# Patient Record
Sex: MALE | Race: WHITE | NOT HISPANIC OR LATINO | Employment: FULL TIME | URBAN - METROPOLITAN AREA
[De-identification: names, ages, dates, MRNs, and addresses within clinical notes are randomized per-mention and may not be internally consistent; named-entity substitution may affect disease eponyms.]

---

## 2024-06-12 ENCOUNTER — OFFICE VISIT (OUTPATIENT)
Dept: PODIATRY | Facility: CLINIC | Age: 24
End: 2024-06-12
Payer: COMMERCIAL

## 2024-06-12 ENCOUNTER — ANCILLARY PROCEDURE (OUTPATIENT)
Dept: GENERAL RADIOLOGY | Facility: CLINIC | Age: 24
End: 2024-06-12
Attending: PODIATRIST
Payer: COMMERCIAL

## 2024-06-12 ENCOUNTER — TELEPHONE (OUTPATIENT)
Dept: PODIATRY | Facility: CLINIC | Age: 24
End: 2024-06-12

## 2024-06-12 VITALS
DIASTOLIC BLOOD PRESSURE: 60 MMHG | HEIGHT: 68 IN | WEIGHT: 165.2 LBS | SYSTOLIC BLOOD PRESSURE: 118 MMHG | BODY MASS INDEX: 25.04 KG/M2

## 2024-06-12 DIAGNOSIS — S99.922A INJURY OF TOE ON LEFT FOOT, INITIAL ENCOUNTER: ICD-10-CM

## 2024-06-12 DIAGNOSIS — S99.921A INJURY OF TOE, RIGHT, INITIAL ENCOUNTER: ICD-10-CM

## 2024-06-12 DIAGNOSIS — S92.411A CLOSED DISPLACED FRACTURE OF PROXIMAL PHALANX OF RIGHT GREAT TOE, INITIAL ENCOUNTER: Primary | ICD-10-CM

## 2024-06-12 PROCEDURE — 73660 X-RAY EXAM OF TOE(S): CPT | Mod: TC | Performed by: RADIOLOGY

## 2024-06-12 PROCEDURE — 99204 OFFICE O/P NEW MOD 45 MIN: CPT | Performed by: PODIATRIST

## 2024-06-12 NOTE — TELEPHONE ENCOUNTER
Form or Letter   Type or form/letter needing completion: Needing a letter for his employer that he is not able to work  Provider:   Date form needed: ASAP  Once completed: Mail form to Name: jessica, at Address: [address]    Could we send this information to you in gokit or would you prefer to receive a phone call?:   Patient would prefer a phone call   Okay to leave a detailed message?: Yes at Cell number on file:    Telephone Information:   Mobile [phone]   [place] [phone]

## 2024-06-12 NOTE — PATIENT INSTRUCTIONS
Thank you for choosing [facility] Podiatry / Foot & Ankle Surgery!    DR. DELGADO'S CLINIC LOCATIONS:     [facility] TRIAGE LINE: [phone]   [address] [place] [address] APPOINTMENTS: [phone]   [place] [address] RADIOLOGY: [phone]   (Every other Tues - Wed - Fri PM) SET UP SURGERY: [phone]    PHYSICAL THERAPY: [phone]   [place] SPECIALTY BILLING QUESTIONS: [phone] [address] FAX: [phone]   [place] [address]    (Thurs & Fri AM)       PRICE THERAPY  Many aches and pains throughout the foot and ankle can be helped with many simple treatments. This is usually described as PRICE Therapy.      P - Protection - often times, inflammation/pain in the lower extremity is not able to improve simply because the areas involved are never allowed to rest. Every step we take can bother the problematic area. Protecting those areas is an important step in the healing process. This may involve a walking cast boot, a special insert/orthotic device, an ankle brace, or simply avoiding barefoot walking.    R - Rest - in addition to protecting the foot/ankle, resting is an important, but often times difficult, treatment option. Getting off your feet when they bother you, and specifically avoiding activities that cause pain/discomfort, are very beneficial to prevent, and treat, foot/ankle pain.      I - Ice - icing regularly can help to decrease inflammation and swelling in the foot, thus decreasing pain. Using an ice pack or a bag of frozen veggies works very well. Ice for 20 minutes multiple times per day as needed.  Do not place the ice directly on the skin as this can cause tissue damage.    C - Compression - using a compression wrap or an ACE wrap can help to decrease swelling, which can help to decrease pain. Wearing the wraps is generally not needed at night, but they should be worn on a regular basis when you are going to be on your feet for prolonged periods as gravity tends  "to pull fluids down to your feet/ankles.    E - Elevation - elevating your lower extremities multiple times daily for 15-20 minutes can help to decrease swelling, which works well in decreasing pain levels.    NSAID/Tylenol - Anti-inflammatories like Aleve or ibuprofen, and/or a pain medication, such as Tylenol, can help to improve pain levels and get the issue resolved sooner rather than later. Anyone with liver issues should be careful with Tylenol, and anyone with high blood pressure or heart, stomach or kidney issues should be careful with anti-inflammatories. Please ask if you have questions about these medications, including dosage.    AIRCAST / CAM WALKING BOOT INSTRUCTIONS  - Do NOT drive with CAM walker on. This is due to safety and legal issues.   - Do NOT wear the CAM walker on long car/train rides or on an airplane.  - Remove the CAM walker several times a day and do ankle range of motion (ROM) exercises/wiggle toes.  - It is recommended that a thick-soled shoe be worn on the other foot to offset any created leg length issue.    - You can purchase an \"even up\" on [employer] to place under the other shoe to help too.  - The boot does not have to be worn at night.   - There is an increased risk of developing a blood clot with lower extremity immobilization. ROM exercises and knee-high compression (tenso /ACE wrap) is recommended to lower that risk.   - You should seek medical attention if you experience calf swelling and/or pain, chest pain, or shortness of breath.   If you need to return or exchange the boot, please contact [facility] @ [phone]    "

## 2024-06-12 NOTE — LETTER
6/12/2024      Chun Muro  [address]  [facility] [address]      Dear Colleague,    Thank you for referring your patient, Chun Muro, to the [facility]. Please see a copy of my visit note below.    ASSESSMENT:  Encounter Diagnoses   Name Primary?     Injury of toe on left foot, initial encounter Yes     Closed displaced fracture of proximal phalanx of right great toe, initial encounter      MEDICAL DECISION MAKING:  I personally reviewed the left great toe x-ray images with Chun.  There is a comminuted fracture of the proximal phalanx which involves the joint.  The AP shows relatively good anatomic alignment.  However, the lateral projection shows the distal aspect of the proximal phalanx displaced and tipped upwards.  I explained that this likely would lead to deformity and a higher risk of interphalangeal joint arthritis.    I can offer open reduction with internal fixation and try to reduce his fracture in the sagittal plane.  I explained that this is usually done with a dorsal plate.  He certainly could proceed with conservative care yet this would be more unpredictable and more likely to lead to future arthrosis.  Nonetheless, with surgical intervention or not there is some risk of developing posttraumatic arthritis.  Surgical intervention later in the form of interphalangeal joint arthrodesis might be needed.  I also encouraged him to seek a second opinion if he has any reservations about surgical intervention.    Recommendations:  PRICE therapy  Short Aircast immobilization.  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  He will consider this.  He is open to a second opinion.  The case request is placed.  Chun will check with his employer regarding work options.  I do not recommend any work on his feet during recovery.      Disclaimer: This note consists of symbols derived from  keyboarding, dictation and/or voice recognition software. As a result, there may be errors in the script that have gone undetected. Please consider this when interpreting information found in this chart.    William Crowe, COLBY, FACFAS, MS    [facility] of Podiatry/Foot & Ankle Surgery      ____________________________________________________________________    HPI:       Chun follows up from an emergency department visit on 6/6/2024.  He was diagnosed with a fracture of the right great toe.  The injury occurred 1 week ago.  He apparently kicked some 6 foot, secured PVC piping.  Pain rating is a 4 out of 10 and intermittent.  He was fit with a surgical shoe, is icing and elevating.  *  Past Medical History:   Diagnosis Date     ADHD (attention deficit hyperactivity disorder)    *  *No past surgical history on file.*  *  No current outpatient medications on file.         EXAM:    Vitals: There were no vitals taken for this visit.  BMI: There is no height or weight on file to calculate BMI.    Constitutional:  Chun Muro is in no apparent distress, appears well-nourished.  Cooperative with history and physical exam.    Vascular:  Pedal pulses are palpable for both the DP and PT arteries.  CFT < 3 sec.    Right hallux edema.    Neuro: Light touch sensation is intact to the L4, L5, S1 distributions  No evidence of weakness, spasticity, or contracture in the lower extremities.     Derm: Ecchymosis involving the right hallux.  No skin injury.  No blisters.    Musculoskeletal:    Lower extremity muscle strength is normal. No gross deformities.      X-Ray Findings:  I personally reviewed the right great toe images.  See comments above    XR Foot Right G/E 3 Views  Order: [id number]  Impression      There is a comminuted fracture of the proximal phalanx of the great toe with intra-articular extension to the IP joint. Alignment at the first MTP and IP joints is anatomic.    Other bones of the hindfoot, midfoot, and  forefoot are intact with normal alignment and preserved joint spaces.  Narrative    For Patients: As a result of the 21st Century Cures Act, medical imaging exams and procedure reports are released immediately into your electronic medical record. You may view this report before your referring provider. If you have questions, please contact your health care provider.    EXAM: XR FOOT 3 VIEWS RIGHT  LOCATION: [facility]  DATE: 6/6/2024    INDICATION: Trauma  COMPARISON: None.  Exam End: 06/06/24  7:06 PM    Specimen Collected: 06/06/24  7:06 PM Last Resulted: 06/06/24  7:11 PM   Received From: [facility] & [facility]      Again, thank you for allowing me to participate in the care of your patient.        Sincerely,        William Crowe DPM

## 2024-06-12 NOTE — PROGRESS NOTES
ASSESSMENT:  Encounter Diagnoses   Name Primary?    Injury of toe on left foot, initial encounter Yes    Closed displaced fracture of proximal phalanx of right great toe, initial encounter      MEDICAL DECISION MAKING:  I personally reviewed the left great toe x-ray images with Chun.  There is a comminuted fracture of the proximal phalanx which involves the joint.  The AP shows relatively good anatomic alignment.  However, the lateral projection shows the distal aspect of the proximal phalanx displaced and tipped upwards.  I explained that this likely would lead to deformity and a higher risk of interphalangeal joint arthritis.    I can offer open reduction with internal fixation and try to reduce his fracture in the sagittal plane.  I explained that this is usually done with a dorsal plate.  He certainly could proceed with conservative care yet this would be more unpredictable and more likely to lead to future arthrosis.  Nonetheless, with surgical intervention or not there is some risk of developing posttraumatic arthritis.  Surgical intervention later in the form of interphalangeal joint arthrodesis might be needed.  I also encouraged him to seek a second opinion if he has any reservations about surgical intervention.    Recommendations:  PRICE therapy  Short Aircast immobilization.  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  He will consider this.  He is open to a second opinion.  The case request is placed.  Chun will check with his employer regarding work options.  I do not recommend any work on his feet during recovery.      Disclaimer: This note consists of symbols derived from keyboarding, dictation and/or voice recognition software. As a result, there may be errors in the script that have gone undetected. Please consider this when interpreting information found in this chart.    William Crowe DPM, FACARASELI, MS [place]  Department of Podiatry/Foot & Ankle Surgery      ____________________________________________________________________    HPI:       Chun follows up from an emergency department visit on 6/6/2024.  He was diagnosed with a fracture of the right great toe.  The injury occurred 1 week ago.  He apparently kicked some 6 foot, secured PVC piping.  Pain rating is a 4 out of 10 and intermittent.  He was fit with a surgical shoe, is icing and elevating.  *  Past Medical History:   Diagnosis Date    ADHD (attention deficit hyperactivity disorder)    *  *No past surgical history on file.*  *  No current outpatient medications on file.         EXAM:    Vitals: There were no vitals taken for this visit.  BMI: There is no height or weight on file to calculate BMI.    Constitutional:  Chun Muro is in no apparent distress, appears well-nourished.  Cooperative with history and physical exam.    Vascular:  Pedal pulses are palpable for both the DP and PT arteries.  CFT < 3 sec.    Right hallux edema.    Neuro: Light touch sensation is intact to the L4, L5, S1 distributions  No evidence of weakness, spasticity, or contracture in the lower extremities.     Derm: Ecchymosis involving the right hallux.  No skin injury.  No blisters.    Musculoskeletal:    Lower extremity muscle strength is normal. No gross deformities.      X-Ray Findings:  I personally reviewed the right great toe images.  See comments above    XR Foot Right G/E 3 Views  Order: [id number]  Impression      There is a comminuted fracture of the proximal phalanx of the great toe with intra-articular extension to the IP joint. Alignment at the first MTP and IP joints is anatomic.    Other bones of the hindfoot, midfoot, and forefoot are intact with normal alignment and preserved joint spaces.  Narrative    For Patients: As a result of the 21st Century Cures Act, medical imaging exams and procedure reports are released immediately into your electronic medical record. You may  view this report before your referring provider. If you have questions, please contact your health care provider.    EXAM: XR FOOT 3 VIEWS RIGHT  LOCATION: [facility]  DATE: 6/6/2024    INDICATION: Trauma  COMPARISON: None.  Exam End: 06/06/24  7:06 PM    Specimen Collected: 06/06/24  7:06 PM Last Resulted: 06/06/24  7:11 PM   Received From: [facility]

## 2024-06-12 NOTE — LETTER
June 12, 2024      Chun Muro  [address] [facility] [address]        To Whom It May Concern:    Chun Muro  was evaluated in the podiatry clinic today for a fracture of the right great toe.  This will require, at a minimum, 6 weeks of walking in an Aircast or walking cast.  Surgery was also recommended to fix the break.    It is recommended that he not perform any prolonged weightbearing duties.  This could possibly be for the next 8 to 10 weeks or more.  Seated work only with occasional weightbearing is acceptable.  The protective boot must be worn at all times when weightbearing.      Sincerely,        William Crowe, COLBY

## 2024-06-13 ENCOUNTER — TELEPHONE (OUTPATIENT)
Dept: PODIATRY | Facility: CLINIC | Age: 24
End: 2024-06-13
Payer: COMMERCIAL

## 2024-06-13 NOTE — TELEPHONE ENCOUNTER
Notified patient that letter can be accessed in [employer]. He was appreciative of this. He inquired about a cost estimate for surgery. Writer provided phone number for cost estimates with the business office. No further action needed.     Maribel Garcia MSA, ATC  Certified Athletic Trainer

## 2024-06-19 ENCOUNTER — TELEPHONE (OUTPATIENT)
Dept: PODIATRY | Facility: CLINIC | Age: 24
End: 2024-06-19
Payer: COMMERCIAL

## 2024-06-19 DIAGNOSIS — S99.921A INJURY OF TOE, RIGHT, INITIAL ENCOUNTER: ICD-10-CM

## 2024-06-19 DIAGNOSIS — S92.411A CLOSED DISPLACED FRACTURE OF PROXIMAL PHALANX OF RIGHT GREAT TOE, INITIAL ENCOUNTER: Primary | ICD-10-CM

## 2024-06-19 NOTE — PROGRESS NOTES
Surgery is scheduled for 6/26/2024.  Therefore, a CT of the right foot is ordered to further evaluate the extent of the fracture for planned open reduction with internal fixation.    Dr. Crowe

## 2024-06-19 NOTE — TELEPHONE ENCOUNTER
Patient has been scheduled for surgery. Details are below.    Date of Surgery: 06/26/24    Approximate Arrival Time: SURGERY CENTER WILL CALL 3/4 DAYS PRIOR TO CONFIRM A TIME   Surgeon: Dr. William Crowe    Procedure: OPEN REDUCTION INTERNAL FIXATION, FRACTURE, TOE - Right  Location: [facility], [address] [place] [address] [place] [address]  Surgery Consult: NA  PreOp Physical: 06/25/24  PostOp: 07/03 & 07/11  Packet Mailed/MyChart Sent: YES  Added to [place]: YES    Spoke to: YASMIN

## 2024-06-20 ENCOUNTER — HOSPITAL ENCOUNTER (OUTPATIENT)
Dept: CT IMAGING | Facility: CLINIC | Age: 24
Discharge: HOME | End: 2024-06-20
Attending: PODIATRIST | Admitting: PODIATRIST
Payer: COMMERCIAL

## 2024-06-20 DIAGNOSIS — S99.921A INJURY OF TOE, RIGHT, INITIAL ENCOUNTER: ICD-10-CM

## 2024-06-20 DIAGNOSIS — S92.411A CLOSED DISPLACED FRACTURE OF PROXIMAL PHALANX OF RIGHT GREAT TOE, INITIAL ENCOUNTER: ICD-10-CM

## 2024-06-20 PROCEDURE — 73700 CT LOWER EXTREMITY W/O DYE: CPT | Mod: RT

## 2024-06-25 ENCOUNTER — OFFICE VISIT (OUTPATIENT)
Dept: FAMILY MEDICINE | Facility: CLINIC | Age: 24
End: 2024-06-25
Payer: COMMERCIAL

## 2024-06-25 VITALS
HEIGHT: 68 IN | DIASTOLIC BLOOD PRESSURE: 75 MMHG | TEMPERATURE: 98.4 F | SYSTOLIC BLOOD PRESSURE: 117 MMHG | RESPIRATION RATE: 12 BRPM | OXYGEN SATURATION: 96 % | BODY MASS INDEX: 23.37 KG/M2 | HEART RATE: 83 BPM | WEIGHT: 154.2 LBS

## 2024-06-25 DIAGNOSIS — Z01.818 PREOP GENERAL PHYSICAL EXAM: ICD-10-CM

## 2024-06-25 DIAGNOSIS — S92.401K: Primary | ICD-10-CM

## 2024-06-25 LAB — HGB BLD-MCNC: 14.8 G/DL (ref 13.3–17.7)

## 2024-06-25 PROCEDURE — 99203 OFFICE O/P NEW LOW 30 MIN: CPT | Performed by: PHYSICIAN ASSISTANT

## 2024-06-25 PROCEDURE — 36415 COLL VENOUS BLD VENIPUNCTURE: CPT | Performed by: PHYSICIAN ASSISTANT

## 2024-06-25 PROCEDURE — 85018 HEMOGLOBIN: CPT | Performed by: PHYSICIAN ASSISTANT

## 2024-06-25 ASSESSMENT — PAIN SCALES - GENERAL: PAINLEVEL: NO PAIN (0)

## 2024-06-25 NOTE — PROGRESS NOTES
Preoperative Evaluation  [facility]  [address], [address]  [facility] [address]  Phone: [phone]  Primary Provider: JOSE MERCER  Pre-op Performing Provider: Nithya Flores PA-C  Jun 25, 2024 6/25/2024   Surgical Information   What procedure is being done? OPEN REDUCTION INTERNAL FIXATION, FRACTURE, TOE    Facility or Hospital where procedure/surgery will be performed: [facility]   Who is doing the procedure / surgery? Dr. MACY Crowe   Date of surgery / procedure: 6/26/24   Time of surgery / procedure: 1130   Where do you plan to recover after surgery? at home with family        Fax number for surgical facility: Note does not need to be faxed, will be available electronically in Epic.    Assessment & Plan     The proposed surgical procedure is considered LOW risk.    Closed displaced fracture of phalanx of right great toe with nonunion, unspecified phalanx, subsequent encounter  Pt not on any asa or NSAIDs    Preop general physical exam    - Hemoglobin            - No identified additional risk factors other than previously addressed         Recommendation  Approval given to proceed with proposed procedure, without further diagnostic evaluation.    Cassi Mccollum is a 24 year old, presenting for the following:  Physical        HPI related to upcoming procedure: ORIF right great toe fracture        6/25/2024   Pre-Op Questionnaire   Have you ever had a heart attack or stroke? No   Have you ever had surgery on your heart or blood vessels, such as a stent placement, a coronary artery bypass, or surgery on an artery in your head, neck, heart, or legs? No   Do you have chest pain with activity? No   Do you have a history of heart failure? No   Do you currently have a cold, bronchitis or symptoms of other infection? No   Do you have a cough, shortness of breath, or wheezing? No   Do you or anyone in your family have previous history of blood clots? No   Do you or does anyone in  your family have a serious bleeding problem such as prolonged bleeding following surgeries or cuts? No   Have you ever had problems with anemia or been told to take iron pills? No   Have you had any abnormal blood loss such as black, tarry or bloody stools? No   Have you ever had a blood transfusion? No   Are you willing to have a blood transfusion if it is medically needed before, during, or after your surgery? Yes   Have you or any of your relatives ever had problems with anesthesia? No   Do you have sleep apnea, excessive snoring or daytime drowsiness? No   Do you have any artifical heart valves or other implanted medical devices like a pacemaker, defibrillator, or continuous glucose monitor? No   Do you have artificial joints? No   Are you allergic to latex? No        Health Care Directive  Patient does not have a Health Care Directive or Living Will: Discussed advance care planning with patient; however, patient declined at this time.    Preoperative Review of    reviewed - no record of controlled substances prescribed.          There are no problems to display for this patient.     Past Medical History:   Diagnosis Date    ADHD (attention deficit hyperactivity disorder)      Past Surgical History:   Procedure Laterality Date    ABDOMINAL WALL SURGERY      as an infant     No current outpatient medications on file.       No Known Allergies     Social History     Tobacco Use    Smoking status: Never    Smokeless tobacco: Not on file   Substance Use Topics    Alcohol use: No     Family History   Problem Relation Age of Onset    Thyroid Disease Mother      History   Drug Use No             Review of Systems  Constitutional, neuro, ENT, endocrine, pulmonary, cardiac, gastrointestinal, genitourinary, musculoskeletal, integument and psychiatric systems are negative, except as otherwise noted.    Objective    /75 (BP Location: Right arm, Patient Position: Sitting, Cuff Size: Adult Regular)   Pulse 83    "Temp 98.4  F (36.9  C) (Temporal)   Resp 12   Ht 1.727 m (5' 8\")   Wt 69.9 kg (154 lb 3.2 oz)   SpO2 96%   BMI 23.45 kg/m     Estimated body mass index is 23.45 kg/m  as calculated from the following:    Height as of this encounter: 1.727 m (5' 8\").    Weight as of this encounter: 69.9 kg (154 lb 3.2 oz).  Physical Exam  GENERAL: alert and no distress  EYES: Eyes grossly normal to inspection, PERRL and conjunctivae and sclerae normal  HENT: ear canals and TM's normal, nose and mouth without ulcers or lesions  NECK: no adenopathy, no asymmetry, masses, or scars  RESP: lungs clear to auscultation - no rales, rhonchi or wheezes  CV: regular rate and rhythm, normal S1 S2, no S3 or S4, no murmur, click or rub, no peripheral edema  ABDOMEN: soft, nontender, no hepatosplenomegaly, no masses and bowel sounds normal  MS: R foot in walking boot  SKIN: no suspicious lesions or rashes  NEURO: Normal strength and tone, mentation intact and speech normal  PSYCH: mentation appears normal, affect normal/bright      Diagnostics  Results for orders placed or performed in visit on 06/25/24   Hemoglobin     Status: Normal   Result Value Ref Range    Hemoglobin 14.8 13.3 - 17.7 g/dL        No EKG required, no history of coronary heart disease, significant arrhythmia, peripheral arterial disease or other structural heart disease.    Revised Cardiac Risk Index (RCRI)  The patient has the following serious cardiovascular risks for perioperative complications:   - No serious cardiac risks = 0 points     RCRI Interpretation: 0 points: Class I (very low risk - 0.4% complication rate)         Signed Electronically by: Nithya Flores PA-C  Copy of this evaluation report is provided to requesting physician.         "

## 2024-06-26 ENCOUNTER — HOSPITAL ENCOUNTER (OUTPATIENT)
Facility: CLINIC | Age: 24
Discharge: HOME | End: 2024-06-26
Attending: PODIATRIST | Admitting: PODIATRIST
Payer: COMMERCIAL

## 2024-06-26 VITALS
WEIGHT: 152.1 LBS | TEMPERATURE: 98.6 F | HEIGHT: 68 IN | OXYGEN SATURATION: 98 % | RESPIRATION RATE: 16 BRPM | BODY MASS INDEX: 23.05 KG/M2 | SYSTOLIC BLOOD PRESSURE: 125 MMHG | DIASTOLIC BLOOD PRESSURE: 65 MMHG | HEART RATE: 67 BPM

## 2024-06-26 PROCEDURE — 999N000141 HC STATISTIC PRE-PROCEDURE NURSING ASSESSMENT: Performed by: PODIATRIST

## 2024-06-26 RX ORDER — CEFAZOLIN SODIUM/WATER 2 G/20 ML
2 SYRINGE (ML) INTRAVENOUS SEE ADMIN INSTRUCTIONS
Status: DISCONTINUED | OUTPATIENT
Start: 2024-06-26 | End: 2024-06-26 | Stop reason: HOSPADM

## 2024-06-26 RX ORDER — CEFAZOLIN SODIUM/WATER 2 G/20 ML
2 SYRINGE (ML) INTRAVENOUS
Status: DISCONTINUED | OUTPATIENT
Start: 2024-06-26 | End: 2024-06-26 | Stop reason: HOSPADM

## 2024-06-26 ASSESSMENT — ACTIVITIES OF DAILY LIVING (ADL): ADLS_ACUITY_SCORE: 35

## 2024-06-30 ENCOUNTER — HEALTH MAINTENANCE LETTER (OUTPATIENT)
Age: 24
End: 2024-06-30

## 2024-07-02 ENCOUNTER — ANESTHESIA EVENT (OUTPATIENT)
Dept: SURGERY | Facility: CLINIC | Age: 24
End: 2024-07-02
Payer: COMMERCIAL

## 2024-07-02 ENCOUNTER — HOSPITAL ENCOUNTER (OUTPATIENT)
Facility: CLINIC | Age: 24
Discharge: HOME | End: 2024-07-02
Attending: PODIATRIST | Admitting: PODIATRIST
Payer: COMMERCIAL

## 2024-07-02 ENCOUNTER — APPOINTMENT (OUTPATIENT)
Dept: GENERAL RADIOLOGY | Facility: CLINIC | Age: 24
End: 2024-07-02
Attending: PODIATRIST
Payer: COMMERCIAL

## 2024-07-02 ENCOUNTER — ANESTHESIA (OUTPATIENT)
Dept: SURGERY | Facility: CLINIC | Age: 24
End: 2024-07-02
Payer: COMMERCIAL

## 2024-07-02 VITALS
WEIGHT: 156.7 LBS | TEMPERATURE: 97.1 F | HEART RATE: 57 BPM | OXYGEN SATURATION: 98 % | SYSTOLIC BLOOD PRESSURE: 118 MMHG | BODY MASS INDEX: 23.75 KG/M2 | RESPIRATION RATE: 15 BRPM | HEIGHT: 68 IN | DIASTOLIC BLOOD PRESSURE: 81 MMHG

## 2024-07-02 DIAGNOSIS — Z98.890 POST-OPERATIVE STATE: ICD-10-CM

## 2024-07-02 DIAGNOSIS — T50.905A ITCHING DUE TO DRUG: ICD-10-CM

## 2024-07-02 DIAGNOSIS — L29.89 ITCHING DUE TO DRUG: ICD-10-CM

## 2024-07-02 DIAGNOSIS — G89.18 POST-OPERATIVE PAIN: Primary | ICD-10-CM

## 2024-07-02 PROCEDURE — C1713 ANCHOR/SCREW BN/BN,TIS/BN: HCPCS | Performed by: PODIATRIST

## 2024-07-02 PROCEDURE — C1762 CONN TISS, HUMAN(INC FASCIA): HCPCS | Performed by: PODIATRIST

## 2024-07-02 PROCEDURE — 250N000011 HC RX IP 250 OP 636: Performed by: NURSE ANESTHETIST, CERTIFIED REGISTERED

## 2024-07-02 PROCEDURE — 360N000084 HC SURGERY LEVEL 4 W/ FLUORO, PER MIN: Performed by: PODIATRIST

## 2024-07-02 PROCEDURE — 250N000009 HC RX 250: Performed by: PODIATRIST

## 2024-07-02 PROCEDURE — 250N000011 HC RX IP 250 OP 636: Performed by: PODIATRIST

## 2024-07-02 PROCEDURE — 28505 TREAT BIG TOE FRACTURE: CPT | Performed by: NURSE ANESTHETIST, CERTIFIED REGISTERED

## 2024-07-02 PROCEDURE — 999N000065 XR FOOT PORT RIGHT 3 VIEWS: Mod: RT

## 2024-07-02 PROCEDURE — 28505 TREAT BIG TOE FRACTURE: CPT | Mod: T5 | Performed by: PODIATRIST

## 2024-07-02 PROCEDURE — 28505 TREAT BIG TOE FRACTURE: CPT | Performed by: ANESTHESIOLOGY

## 2024-07-02 PROCEDURE — 250N000025 HC SEVOFLURANE, PER MIN: Performed by: PODIATRIST

## 2024-07-02 PROCEDURE — 370N000017 HC ANESTHESIA TECHNICAL FEE, PER MIN: Performed by: PODIATRIST

## 2024-07-02 PROCEDURE — 710N000009 HC RECOVERY PHASE 1, LEVEL 1, PER MIN: Performed by: PODIATRIST

## 2024-07-02 PROCEDURE — 710N000012 HC RECOVERY PHASE 2, PER MINUTE: Performed by: PODIATRIST

## 2024-07-02 PROCEDURE — 272N000001 HC OR GENERAL SUPPLY STERILE: Performed by: PODIATRIST

## 2024-07-02 PROCEDURE — 272N000002 HC OR SUPPLY OTHER OPNP: Performed by: PODIATRIST

## 2024-07-02 PROCEDURE — 999N000179 XR SURGERY CARM FLUORO LESS THAN 5 MIN W STILLS

## 2024-07-02 PROCEDURE — 999N000141 HC STATISTIC PRE-PROCEDURE NURSING ASSESSMENT: Performed by: PODIATRIST

## 2024-07-02 PROCEDURE — 258N000003 HC RX IP 258 OP 636: Performed by: NURSE ANESTHETIST, CERTIFIED REGISTERED

## 2024-07-02 PROCEDURE — 271N000001 HC OR GENERAL SUPPLY NON-STERILE: Performed by: PODIATRIST

## 2024-07-02 PROCEDURE — 250N000009 HC RX 250: Performed by: NURSE ANESTHETIST, CERTIFIED REGISTERED

## 2024-07-02 DEVICE — VAL SCREW, TI, 1.6 X 8MM
Type: IMPLANTABLE DEVICE | Site: TOE | Status: FUNCTIONAL
Brand: ARTHREX®

## 2024-07-02 DEVICE — GRAFT BONE CHIPS CANC CUBE 15CC 100315: Type: IMPLANTABLE DEVICE | Site: TOE | Status: FUNCTIONAL

## 2024-07-02 RX ORDER — FENTANYL CITRATE 0.05 MG/ML
50 INJECTION, SOLUTION INTRAMUSCULAR; INTRAVENOUS EVERY 5 MIN PRN
Status: DISCONTINUED | OUTPATIENT
Start: 2024-07-02 | End: 2024-07-02 | Stop reason: HOSPADM

## 2024-07-02 RX ORDER — KETOROLAC TROMETHAMINE 30 MG/ML
INJECTION, SOLUTION INTRAMUSCULAR; INTRAVENOUS PRN
Status: DISCONTINUED | OUTPATIENT
Start: 2024-07-02 | End: 2024-07-02

## 2024-07-02 RX ORDER — PROPOFOL 10 MG/ML
INJECTION, EMULSION INTRAVENOUS PRN
Status: DISCONTINUED | OUTPATIENT
Start: 2024-07-02 | End: 2024-07-02

## 2024-07-02 RX ORDER — FENTANYL CITRATE 50 UG/ML
INJECTION, SOLUTION INTRAMUSCULAR; INTRAVENOUS PRN
Status: DISCONTINUED | OUTPATIENT
Start: 2024-07-02 | End: 2024-07-02

## 2024-07-02 RX ORDER — ONDANSETRON 2 MG/ML
INJECTION INTRAMUSCULAR; INTRAVENOUS PRN
Status: DISCONTINUED | OUTPATIENT
Start: 2024-07-02 | End: 2024-07-02

## 2024-07-02 RX ORDER — ONDANSETRON 2 MG/ML
4 INJECTION INTRAMUSCULAR; INTRAVENOUS EVERY 30 MIN PRN
Status: DISCONTINUED | OUTPATIENT
Start: 2024-07-02 | End: 2024-07-02 | Stop reason: HOSPADM

## 2024-07-02 RX ORDER — PROPOFOL 10 MG/ML
INJECTION, EMULSION INTRAVENOUS CONTINUOUS PRN
Status: DISCONTINUED | OUTPATIENT
Start: 2024-07-02 | End: 2024-07-02

## 2024-07-02 RX ORDER — LABETALOL HYDROCHLORIDE 5 MG/ML
10 INJECTION, SOLUTION INTRAVENOUS
Status: DISCONTINUED | OUTPATIENT
Start: 2024-07-02 | End: 2024-07-02 | Stop reason: HOSPADM

## 2024-07-02 RX ORDER — NALOXONE HYDROCHLORIDE 0.4 MG/ML
0.1 INJECTION, SOLUTION INTRAMUSCULAR; INTRAVENOUS; SUBCUTANEOUS
Status: DISCONTINUED | OUTPATIENT
Start: 2024-07-02 | End: 2024-07-02 | Stop reason: HOSPADM

## 2024-07-02 RX ORDER — HYDROMORPHONE HCL IN WATER/PF 6 MG/30 ML
0.2 PATIENT CONTROLLED ANALGESIA SYRINGE INTRAVENOUS EVERY 5 MIN PRN
Status: DISCONTINUED | OUTPATIENT
Start: 2024-07-02 | End: 2024-07-02 | Stop reason: HOSPADM

## 2024-07-02 RX ORDER — HYDROMORPHONE HCL IN WATER/PF 6 MG/30 ML
0.4 PATIENT CONTROLLED ANALGESIA SYRINGE INTRAVENOUS EVERY 5 MIN PRN
Status: DISCONTINUED | OUTPATIENT
Start: 2024-07-02 | End: 2024-07-02 | Stop reason: HOSPADM

## 2024-07-02 RX ORDER — HYDRALAZINE HYDROCHLORIDE 20 MG/ML
2.5-5 INJECTION INTRAMUSCULAR; INTRAVENOUS EVERY 10 MIN PRN
Status: DISCONTINUED | OUTPATIENT
Start: 2024-07-02 | End: 2024-07-02 | Stop reason: HOSPADM

## 2024-07-02 RX ORDER — OXYCODONE HCL 5 MG/5 ML
5 SOLUTION, ORAL ORAL EVERY 6 HOURS PRN
Qty: 80 ML | Refills: 0 | Status: SHIPPED | OUTPATIENT
Start: 2024-07-02 | End: 2024-07-06

## 2024-07-02 RX ORDER — SODIUM CHLORIDE, SODIUM LACTATE, POTASSIUM CHLORIDE, CALCIUM CHLORIDE 600; 310; 30; 20 MG/100ML; MG/100ML; MG/100ML; MG/100ML
INJECTION, SOLUTION INTRAVENOUS CONTINUOUS
Status: DISCONTINUED | OUTPATIENT
Start: 2024-07-02 | End: 2024-07-02 | Stop reason: HOSPADM

## 2024-07-02 RX ORDER — CEFAZOLIN SODIUM/WATER 2 G/20 ML
2 SYRINGE (ML) INTRAVENOUS
Status: COMPLETED | OUTPATIENT
Start: 2024-07-02 | End: 2024-07-02

## 2024-07-02 RX ORDER — DEXMEDETOMIDINE HYDROCHLORIDE 4 UG/ML
INJECTION, SOLUTION INTRAVENOUS PRN
Status: DISCONTINUED | OUTPATIENT
Start: 2024-07-02 | End: 2024-07-02

## 2024-07-02 RX ORDER — IBUPROFEN 600 MG/1
600 TABLET, FILM COATED ORAL EVERY 6 HOURS PRN
Qty: 28 TABLET | Refills: 0 | Status: SHIPPED | OUTPATIENT
Start: 2024-07-02

## 2024-07-02 RX ORDER — CEFAZOLIN SODIUM/WATER 2 G/20 ML
2 SYRINGE (ML) INTRAVENOUS SEE ADMIN INSTRUCTIONS
Status: DISCONTINUED | OUTPATIENT
Start: 2024-07-02 | End: 2024-07-02 | Stop reason: HOSPADM

## 2024-07-02 RX ORDER — DEXAMETHASONE SODIUM PHOSPHATE 4 MG/ML
4 INJECTION, SOLUTION INTRA-ARTICULAR; INTRALESIONAL; INTRAMUSCULAR; INTRAVENOUS; SOFT TISSUE
Status: DISCONTINUED | OUTPATIENT
Start: 2024-07-02 | End: 2024-07-02 | Stop reason: HOSPADM

## 2024-07-02 RX ORDER — SODIUM CHLORIDE, SODIUM LACTATE, POTASSIUM CHLORIDE, CALCIUM CHLORIDE 600; 310; 30; 20 MG/100ML; MG/100ML; MG/100ML; MG/100ML
INJECTION, SOLUTION INTRAVENOUS CONTINUOUS PRN
Status: DISCONTINUED | OUTPATIENT
Start: 2024-07-02 | End: 2024-07-02

## 2024-07-02 RX ORDER — ACETAMINOPHEN 500 MG
500-1000 TABLET ORAL EVERY 8 HOURS PRN
Qty: 42 TABLET | Refills: 0 | Status: SHIPPED | OUTPATIENT
Start: 2024-07-02

## 2024-07-02 RX ORDER — HYDROXYZINE HYDROCHLORIDE 25 MG/1
25 TABLET, FILM COATED ORAL EVERY 6 HOURS PRN
Qty: 28 TABLET | Refills: 1 | Status: SHIPPED | OUTPATIENT
Start: 2024-07-02

## 2024-07-02 RX ORDER — LIDOCAINE HYDROCHLORIDE 20 MG/ML
INJECTION, SOLUTION INFILTRATION; PERINEURAL PRN
Status: DISCONTINUED | OUTPATIENT
Start: 2024-07-02 | End: 2024-07-02

## 2024-07-02 RX ORDER — ONDANSETRON 4 MG/1
4 TABLET, ORALLY DISINTEGRATING ORAL EVERY 30 MIN PRN
Status: DISCONTINUED | OUTPATIENT
Start: 2024-07-02 | End: 2024-07-02 | Stop reason: HOSPADM

## 2024-07-02 RX ORDER — FENTANYL CITRATE 0.05 MG/ML
25 INJECTION, SOLUTION INTRAMUSCULAR; INTRAVENOUS EVERY 5 MIN PRN
Status: DISCONTINUED | OUTPATIENT
Start: 2024-07-02 | End: 2024-07-02 | Stop reason: HOSPADM

## 2024-07-02 RX ORDER — BUPIVACAINE HYDROCHLORIDE 5 MG/ML
INJECTION, SOLUTION PERINEURAL PRN
Status: DISCONTINUED | OUTPATIENT
Start: 2024-07-02 | End: 2024-07-02 | Stop reason: HOSPADM

## 2024-07-02 RX ORDER — HYDROXYZINE HYDROCHLORIDE 25 MG/1
25 TABLET, FILM COATED ORAL EVERY 6 HOURS PRN
Status: DISCONTINUED | OUTPATIENT
Start: 2024-07-02 | End: 2024-07-02 | Stop reason: HOSPADM

## 2024-07-02 RX ORDER — MAGNESIUM HYDROXIDE 1200 MG/15ML
LIQUID ORAL PRN
Status: DISCONTINUED | OUTPATIENT
Start: 2024-07-02 | End: 2024-07-02 | Stop reason: HOSPADM

## 2024-07-02 RX ORDER — DEXAMETHASONE SODIUM PHOSPHATE 4 MG/ML
INJECTION, SOLUTION INTRA-ARTICULAR; INTRALESIONAL; INTRAMUSCULAR; INTRAVENOUS; SOFT TISSUE PRN
Status: DISCONTINUED | OUTPATIENT
Start: 2024-07-02 | End: 2024-07-02

## 2024-07-02 RX ORDER — ACETAMINOPHEN 325 MG/1
975 TABLET ORAL ONCE
Status: DISCONTINUED | OUTPATIENT
Start: 2024-07-02 | End: 2024-07-02 | Stop reason: HOSPADM

## 2024-07-02 RX ADMIN — FENTANYL CITRATE 100 MCG: 50 INJECTION INTRAMUSCULAR; INTRAVENOUS at 12:00

## 2024-07-02 RX ADMIN — DEXAMETHASONE SODIUM PHOSPHATE 4 MG: 4 INJECTION, SOLUTION INTRA-ARTICULAR; INTRALESIONAL; INTRAMUSCULAR; INTRAVENOUS; SOFT TISSUE at 12:09

## 2024-07-02 RX ADMIN — DEXMEDETOMIDINE HYDROCHLORIDE 10 MCG: 200 INJECTION INTRAVENOUS at 13:26

## 2024-07-02 RX ADMIN — Medication 2 G: at 12:05

## 2024-07-02 RX ADMIN — MIDAZOLAM 2 MG: 1 INJECTION INTRAMUSCULAR; INTRAVENOUS at 11:56

## 2024-07-02 RX ADMIN — SODIUM CHLORIDE, POTASSIUM CHLORIDE, SODIUM LACTATE AND CALCIUM CHLORIDE: 600; 310; 30; 20 INJECTION, SOLUTION INTRAVENOUS at 11:56

## 2024-07-02 RX ADMIN — DEXMEDETOMIDINE HYDROCHLORIDE 10 MCG: 200 INJECTION INTRAVENOUS at 13:33

## 2024-07-02 RX ADMIN — PROPOFOL 50 MG: 10 INJECTION, EMULSION INTRAVENOUS at 12:21

## 2024-07-02 RX ADMIN — PROPOFOL 50 MCG/KG/MIN: 10 INJECTION, EMULSION INTRAVENOUS at 12:02

## 2024-07-02 RX ADMIN — LIDOCAINE HYDROCHLORIDE 100 MG: 20 INJECTION, SOLUTION INFILTRATION; PERINEURAL at 12:00

## 2024-07-02 RX ADMIN — PROPOFOL 100 MG: 10 INJECTION, EMULSION INTRAVENOUS at 12:02

## 2024-07-02 RX ADMIN — ONDANSETRON 4 MG: 2 INJECTION INTRAMUSCULAR; INTRAVENOUS at 13:05

## 2024-07-02 RX ADMIN — KETOROLAC TROMETHAMINE 30 MG: 30 INJECTION, SOLUTION INTRAMUSCULAR at 13:43

## 2024-07-02 RX ADMIN — PROPOFOL 200 MG: 10 INJECTION, EMULSION INTRAVENOUS at 12:00

## 2024-07-02 ASSESSMENT — ACTIVITIES OF DAILY LIVING (ADL)
ADLS_ACUITY_SCORE: 35
ADLS_ACUITY_SCORE: 33
ADLS_ACUITY_SCORE: 35

## 2024-07-02 ASSESSMENT — LIFESTYLE VARIABLES: TOBACCO_USE: 0

## 2024-07-02 NOTE — BRIEF OP NOTE
[facility] Brief Operative Note    Pre-operative diagnosis: Closed displaced fracture of phalanx of right great toe, unspecified phalanx, initial encounter [S99.718A]   Post-operative diagnosis same   Procedure: Procedure(s):  OPEN REDUCTION INTERNAL FIXATION, RIGHT GREAT TOE   Surgeon(s): Surgeons and Role:     * William Crowe DPM - Primary   Estimated blood loss: 2 mL    Specimens: * No specimens in log *   Findings: The fractures had started to heal, yet we were able to define and liberate them for reduction. The toe contracture is improved.  I suspect since the injury occurred nearly a month ago, soft tissues contracted on the plantar aspect of the joint. This was related to an interphalangeal joint contracture due to fracture deformity.

## 2024-07-02 NOTE — ANESTHESIA PREPROCEDURE EVALUATION
"Anesthesia Pre-Procedure Evaluation    Patient: Chun Muro   MRN: [id number] : 2000        Procedure : Procedure(s):  OPEN REDUCTION INTERNAL FIXATION, RIGHT GREAT TOE          Past Medical History:   Diagnosis Date    ADHD (attention deficit hyperactivity disorder)       Past Surgical History:   Procedure Laterality Date    ABDOMINAL WALL SURGERY      as an infant      No Known Allergies   Social History     Tobacco Use    Smoking status: Never    Smokeless tobacco: Not on file   Substance Use Topics    Alcohol use: No      Wt Readings from Last 1 Encounters:   24 71.1 kg (156 lb 11.2 oz)        Anesthesia Evaluation   Pt has had prior anesthetic. Type: General.    No history of anesthetic complications       ROS/MED HX  ENT/Pulmonary:    (-) tobacco use   Neurologic:       Cardiovascular:       METS/Exercise Tolerance:     Hematologic:       Musculoskeletal:       GI/Hepatic:       Renal/Genitourinary:       Endo:       Psychiatric/Substance Use:     (+) psychiatric history other (comment) (ADD)       Infectious Disease:       Malignancy:       Other:            Physical Exam    Airway        Mallampati: I   TM distance: > 3 FB   Neck ROM: full   Mouth opening: > 3 cm    Respiratory Devices and Support         Dental           Cardiovascular          Rhythm and rate: regular and normal     Pulmonary           breath sounds clear to auscultation           OUTSIDE LABS:  CBC:   Lab Results   Component Value Date    HGB 14.8 2024     BMP: No results found for: \"NA\", \"POTASSIUM\", \"CHLORIDE\", \"CO2\", \"BUN\", \"CR\", \"GLC\"  COAGS: No results found for: \"PTT\", \"INR\", \"FIBR\"  POC: No results found for: \"BGM\", \"HCG\", \"HCGS\"  HEPATIC: No results found for: \"ALBUMIN\", \"PROTTOTAL\", \"ALT\", \"AST\", \"GGT\", \"ALKPHOS\", \"BILITOTAL\", \"BILIDIRECT\", \"JUANITA\"  OTHER: No results found for: \"PH\", \"LACT\", \"A1C\", \"TIFF\", \"PHOS\", \"MAG\", \"LIPASE\", \"AMYLASE\", \"TSH\", \"T4\", \"T3\", \"CRP\", \"SED\"    Anesthesia Plan    ASA Status:  2  "   NPO Status:  NPO Appropriate    Anesthesia Type: General.     - Airway: LMA   Induction: Intravenous.   Maintenance: Balanced.        Consents    Anesthesia Plan(s) and associated risks, benefits, and realistic alternatives discussed. Questions answered and patient/representative(s) expressed understanding.     - Discussed:     - Discussed with:  Patient            Postoperative Care    Pain management: IV analgesics, Oral pain medications, Multi-modal analgesia.   PONV prophylaxis: Ondansetron (or other 5HT-3), Dexamethasone or Solumedrol, Background Propofol Infusion     Comments:               Tex Goldman MD    I have reviewed the pertinent notes and labs in the chart from the past 30 days and (re)examined the patient.  Any updates or changes from those notes are reflected in this note.

## 2024-07-02 NOTE — DISCHARGE INSTRUCTIONS
** Today you received Toradol, an antiinflammatory medication similar to Ibuprofen.  You should not take other antiinflammatory medication, such as Ibuprofen, Motrin, Advil, Aleve, Naprosyn, etc until 8:00 pm tonight. **        Same Day Surgery Discharge Instructions for  Sedation and General Anesthesia     It's not unusual to feel dizzy, light-headed or faint for up to 24 hours after surgery or while taking pain medication.  If you have these symptoms: sit for a few minutes before standing and have someone assist you when you get up to walk or use the bathroom.    You should rest and relax for the next 24 hours. We recommend you make arrangements to have an adult stay with you for at least 24 hours after your discharge.  Avoid hazardous and strenuous activity.    DO NOT DRIVE any vehicle or operate mechanical equipment for 24 hours following the end of your surgery.  Even though you may feel normal, your reactions may be affected by the medication you have received.    Do not drink alcoholic beverages for 24 hours following surgery.     Slowly progress to your regular diet as you feel able. It's not unusual to feel nauseated and/or vomit after receiving anesthesia.  If you develop these symptoms, drink clear liquids (apple juice, ginger ale, broth, 7-up, etc. ) until you feel better.  If your nausea and vomiting persists for 24 hours, please notify your surgeon.      All narcotic pain medications, along with inactivity and anesthesia, can cause constipation. Drinking plenty of liquids and increasing fiber intake will help.    For any questions of a medical nature, call your surgeon.    Do not make important decisions for 24 hours.    If you had general anesthesia, you may have a sore throat for a couple of days related to the breathing tube used during surgery.  You may use Cepacol lozenges to help with this discomfort.  If it worsens or if you develop a fever, contact your surgeon.     If you feel your pain is  not well managed with the pain medications prescribed by your surgeon, please contact your surgeon's office to let them know so they can address your concerns.           ** If you have questions or concerns about your procedure,  call Dr. Crowe at [phone] **

## 2024-07-02 NOTE — ANESTHESIA POSTPROCEDURE EVALUATION
Patient: Chnu Muro    Procedure: Procedure(s):  OPEN REDUCTION INTERNAL FIXATION, RIGHT GREAT TOE       Anesthesia Type:  General    Note:     Postop Pain Control: Uneventful            Sign Out: Well controlled pain   PONV: No   Neuro/Psych: Uneventful            Sign Out: Acceptable/Baseline neuro status   Airway/Respiratory: Uneventful            Sign Out: Acceptable/Baseline resp. status   CV/Hemodynamics: Uneventful            Sign Out: Acceptable CV status   Other NRE: NONE   DID A NON-ROUTINE EVENT OCCUR?            Last vitals:  Vitals Value Taken Time   /73 07/02/24 1430   Temp 36.1  C (96.9  F) 07/02/24 1415   Pulse 79 07/02/24 1437   Resp 10 07/02/24 1437   SpO2 98 % 07/02/24 1437   Vitals shown include unfiled device data.    Electronically Signed By: Tex Goldman MD  July 2, 2024  2:38 PM

## 2024-07-02 NOTE — ANESTHESIA PROCEDURE NOTES
Airway       Patient location during procedure: OR ([facility] - Operating Room or Procedural Area)  Staff -        Anesthesiologist:  Carlos Broderick MD       CRNA: Fernanda Kapadia APRN CRNA       Performed By: CRNAIndications and Patient Condition       Indications for airway management: bernadette-procedural       Induction type:intravenous       Mask difficulty assessment: 1 - vent by mask    Final Airway Details       Final airway type: supraglottic airway    Supraglottic Airway Details        Type: LMA       Brand: I-Gel       LMA size: 5    Post intubation assessment        Number of other approaches attempted: 0       Secured with: tape       Ease of procedure: easy       Dentition: Intact and Unchanged

## 2024-07-02 NOTE — ANESTHESIA CARE TRANSFER NOTE
Patient: Chun Muro    Procedure: Procedure(s):  OPEN REDUCTION INTERNAL FIXATION, RIGHT GREAT TOE       Diagnosis: Closed displaced fracture of phalanx of right great toe, unspecified phalanx, initial encounter [S92.401A]  Diagnosis Additional Information: No value filed.    Anesthesia Type:   General     Note:    Oropharynx: oropharynx clear of all foreign objects and spontaneously breathing  Level of Consciousness: awake  Oxygen Supplementation: nasal cannula  Level of Supplemental Oxygen (L/min / FiO2): 2  Independent Airway: airway patency satisfactory and stable  Dentition: dentition unchanged  Vital Signs Stable: post-procedure vital signs reviewed and stable  Report to RN Given: handoff report given  Patient transferred to: PACU  Comments: Pt to PACU with O2 via nasal cannula, airway patent, VSS. Report to RN.  Handoff Report: Identifed the Patient, Identified the Reponsible Provider, Reviewed the pertinent medical history, Discussed the surgical course, Reviewed Intra-OP anesthesia mangement and issues during anesthesia, Set expectations for post-procedure period and Allowed opportunity for questions and acknowledgement of understanding  Vitals:  Vitals Value Taken Time   /65 07/02/24 1414   Temp     Pulse 73 07/02/24 1418   Resp 7 07/02/24 1418   SpO2 100 % 07/02/24 1418   Vitals shown include unfiled device data.    Electronically Signed By: COLLEEN Thrasher CRNA  July 2, 2024  2:19 PM

## 2024-07-03 NOTE — OP NOTE
Operative Report    July 3, 2024        SURGEON:  William Crowe DPM, HUOG MS    : Ni Funes DPM    PREOPERATIVE DIAGNOSIS:   1) displaced, intra-articular fracture of the distal proximal phalanx, right hallux    POSTOPERATIVE DIAGNOSIS: Same    PROCEDURE(S):  1) ORIF right hallux, proximal phalanx    ANESTHESIA: General    HEMOSTASIS: Thigh pneumatic tourniquet    ESTIMATED BLOOD LOSS: 2 mL    MATERIALS: Absorbable nonabsorbable suture material.  One 1.6 mm cage plate from [employer].  Seven 1.6 mm locking screws    INJECTABLES:  0.5% Marcaine     COMPLICATIONS:   None apparent    INTRAOPERATIVE FINDINGS: With mobilization or liberation of the fracture fragments, there was an ongoing contracture of the interphalangeal joint.  With some distraction and with fracture reduction, much of this was reduced.  Although intra-articular, the 2 sections of the joint were well aligned.    INDICATIONS FOR SURGERY:  Chun Muro is a 24-year-old male who initially presented to my clinic on 6/12/2024 after sustaining an injury to the right hallux in early June.  He reported kicking a secured section of PVC piping.  The distal aspect of the proximal phalanx was displaced dorsally creating a flexion deformity at the interphalangeal joint.  A CT of the foot better defined the fracture.  Due to this dorsal displacement or angulation, open reduction with internal fixation was offered.  Although no guarantees were given, I explained that the healing and outcome is likely more predictable with surgical treatment.  The surgery was delayed a week, due to the patient eating the morning of his original surgical date of 6/26/2024.    PROCEDURE: Chun Muro was transported to the operating room and placed supine on the operating table.  General endotracheal anesthesia was initiated.  The right lower extremity was prepped and draped in the normal aseptic fashion.  A timeout for the procedure was called.  The right lower  extremity was exsanguinated and the thigh tourniquet inflated.    Local anesthetic was injected in a Win block fashion, right foot.  A longitudinal incision was made overlying the proximal phalanx of the right hallux.  Layered dissection was performed.  Bleeding vessels were electrocauterized.    A transverse tenotomy and capsulotomy was performed to gain exposure of the proximal phalangeal head.  Subcapsular and subperiosteal reflection was performed.  The fracture lines were identified and fracture was liberated using a freer elevator.  We were able to reduce the distal aspect of the proximal phalanx in a more anatomic position and this was confirmed with intraoperative fluoroscopy.  It was challenging due to the ongoing contracture deformity.  Soft tissues were thought to be adequately released from the head of the phalanx.  In addition to this, the distal aspect of the toe was distracted.  Eventually we are able to reduce some of the contracture deformity.    With the distal aspect of the proximal phalanx held in reduction, permanent fixation was placed involving 1.6 mm cage plate placed dorsally.  This was secured by seven 1.6 mm locking screws.    Intraoperative imaging confirmed satisfactory reduction of the fracture deformity and placement of the hardware.  A small amount of demineralized bone matrix was placed in bony voids.  The wound was irrigated with a copious amount normal sterile saline.  The extensor tendon and capsular tissue was repaired.  Layered closure was performed.    At this point the toe appeared more rectus in all 3 planes.  There was reduction of the contracture deformity.    A well-padded compressive dressing was placed.  Chun Muro tolerated the anesthesia and procedure well.  No apparent complications.  EBL 2 mL.  Counts were correct.    William Crowe DPM, FACFAS, MS  [facility] Department of Podiatry/Foot & Ankle Surgery

## 2024-07-11 ENCOUNTER — OFFICE VISIT (OUTPATIENT)
Dept: PODIATRY | Facility: CLINIC | Age: 24
End: 2024-07-11
Payer: COMMERCIAL

## 2024-07-11 VITALS
DIASTOLIC BLOOD PRESSURE: 80 MMHG | BODY MASS INDEX: 23.64 KG/M2 | WEIGHT: 156 LBS | SYSTOLIC BLOOD PRESSURE: 122 MMHG | HEIGHT: 68 IN

## 2024-07-11 DIAGNOSIS — L03.031 CELLULITIS OF TOE OF RIGHT FOOT: ICD-10-CM

## 2024-07-11 DIAGNOSIS — Z09 SURGERY FOLLOW-UP EXAMINATION: Primary | ICD-10-CM

## 2024-07-11 PROCEDURE — 99024 POSTOP FOLLOW-UP VISIT: CPT | Performed by: PODIATRIST

## 2024-07-11 NOTE — PROGRESS NOTES
ASSESSMENT:  Encounter Diagnosis   Name Primary?    Surgery follow-up examination Yes     MEDICAL DECISION MAKING:  There is erythema along the incision mostly on the lateral aspect.  Mild edema.  Although this is likely postsurgical reaction, localized cellulitis cannot be ruled out.  Augmentin 875-125mg PO BID x 7 days  Sterile redress of the right foot.  I reviewed the preand postoperative x-rays with Chun  Continue ambulation in the cam walker  Continue removing the device for ankle range of motion exercises  Follow-up in 1 week for suture removal    Disclaimer: This note consists of symbols derived from keyboarding, dictation and/or voice recognition software. As a result, there may be errors in the script that have gone undetected. Please consider this when interpreting information found in this chart.    William Crowe DPM, HUGO, MS    [place] Department of Podiatry/Foot & Ankle Surgery      ____________________________________________________________________    HPI:       Chun presents 9 days postop.    PREOPERATIVE DIAGNOSIS:   1) displaced, intra-articular fracture of the distal proximal phalanx, right hallux     PROCEDURE(S):  1) ORIF right hallux, proximal phalanx    INDICATIONS FOR SURGERY:  Chun Muro is a 24-year-old male who initially presented to my clinic on 6/12/2024 after sustaining an injury to the right hallux in early June.  He reported kicking a secured section of PVC piping.  The distal aspect of the proximal phalanx was displaced dorsally creating a flexion deformity at the interphalangeal joint.  A CT of the foot better defined the fracture.  Due to this dorsal displacement or angulation, open reduction with internal fixation was offered.  Although no guarantees were given, I explained that the healing and outcome is likely more predictable with surgical treatment.  The surgery was delayed a week, due to the patient eating the morning of his original surgical date of 6/26/2024.     Past  Medical History:   Diagnosis Date    ADHD (attention deficit hyperactivity disorder)    *  *  Past Surgical History:   Procedure Laterality Date    ABDOMINAL WALL SURGERY      as an infant    OPEN REDUCTION INTERNAL FIXATION TOE(S) Right 7/2/2024    Procedure: OPEN REDUCTION INTERNAL FIXATION, RIGHT GREAT TOE;  Surgeon: William Crowe DPM;  Location: [facility] OR    WISDOM TEETH      *  *  Current Outpatient Medications   Medication Sig Dispense Refill    acetaminophen (TYLENOL) 500 MG tablet Take 1-2 tablets (500-1,000 mg) by mouth every 8 hours as needed 42 tablet 0    hydrOXYzine HCl (ATARAX) 25 MG tablet Take 1 tablet (25 mg) by mouth every 6 hours as needed 28 tablet 1    ibuprofen (ADVIL/MOTRIN) 600 MG tablet Take 1 tablet (600 mg) by mouth every 6 hours as needed for moderate pain 28 tablet 0         EXAM:    Vitals: There were no vitals taken for this visit.  BMI: There is no height or weight on file to calculate BMI.      Vascular:  Pedal pulses are palpable for both the DP and PT arteries.  CFT < 3 sec.  No edema.      Neuro: Light touch sensation is intact to the L4, L5, S1 distributions  No evidence of weakness, spasticity, or contracture in the lower extremities.     Derm: There is erythema involving the dorsal aspect of the right hallux more on the lateral aspect of the incision.  Moderate edema.  The incision remains coapted and dry.    Musculoskeletal:    The hallux is in a rectus position clinically.

## 2024-07-11 NOTE — LETTER
7/11/2024      Chun Muro  [id number] [facility] [address]      Dear Colleague,    Thank you for referring your patient, Chun Muro, to the [facility]. Please see a copy of my visit note below.    ASSESSMENT:  Encounter Diagnosis   Name Primary?     Surgery follow-up examination Yes     MEDICAL DECISION MAKING:  There is erythema along the incision mostly on the lateral aspect.  Mild edema.  Although this is likely postsurgical reaction, localized cellulitis cannot be ruled out.  Augmentin 875-125mg PO BID x 7 days  Sterile redress of the right foot.  I reviewed the preand postoperative x-rays with Chun  Continue ambulation in the cam walker  Continue removing the device for ankle range of motion exercises  Follow-up in 1 week for suture removal    Disclaimer: This note consists of symbols derived from keyboarding, dictation and/or voice recognition software. As a result, there may be errors in the script that have gone undetected. Please consider this when interpreting information found in this chart.    William Crowe DPM, FACFAS, [facility] Department of Podiatry/Foot & Ankle Surgery      ____________________________________________________________________    HPI:       Chun presents 9 days postop.    PREOPERATIVE DIAGNOSIS:   1) displaced, intra-articular fracture of the distal proximal phalanx, right hallux     PROCEDURE(S):  1) ORIF right hallux, proximal phalanx    INDICATIONS FOR SURGERY:  Chun Muro is a 24-year-old male who initially presented to my clinic on 6/12/2024 after sustaining an injury to the right hallux in early June.  He reported kicking a secured section of PVC piping.  The distal aspect of the proximal phalanx was displaced dorsally creating a flexion deformity at the interphalangeal joint.  A CT of the foot better defined the fracture.  Due to this dorsal displacement or angulation, open reduction with internal fixation was offered.   Although no guarantees were given, I explained that the healing and outcome is likely more predictable with surgical treatment.  The surgery was delayed a week, due to the patient eating the morning of his original surgical date of 6/26/2024.     Past Medical History:   Diagnosis Date     ADHD (attention deficit hyperactivity disorder)    *  *  Past Surgical History:   Procedure Laterality Date     ABDOMINAL WALL SURGERY      as an infant     OPEN REDUCTION INTERNAL FIXATION TOE(S) Right 7/2/2024    Procedure: OPEN REDUCTION INTERNAL FIXATION, RIGHT GREAT TOE;  Surgeon: William Crowe DPM;  Location: [facility] OR     WISDOM TEETH      *  *  Current Outpatient Medications   Medication Sig Dispense Refill     acetaminophen (TYLENOL) 500 MG tablet Take 1-2 tablets (500-1,000 mg) by mouth every 8 hours as needed 42 tablet 0     hydrOXYzine HCl (ATARAX) 25 MG tablet Take 1 tablet (25 mg) by mouth every 6 hours as needed 28 tablet 1     ibuprofen (ADVIL/MOTRIN) 600 MG tablet Take 1 tablet (600 mg) by mouth every 6 hours as needed for moderate pain 28 tablet 0         EXAM:    Vitals: There were no vitals taken for this visit.  BMI: There is no height or weight on file to calculate BMI.      Vascular:  Pedal pulses are palpable for both the DP and PT arteries.  CFT < 3 sec.  No edema.      Neuro: Light touch sensation is intact to the L4, L5, S1 distributions  No evidence of weakness, spasticity, or contracture in the lower extremities.     Derm: There is erythema involving the dorsal aspect of the right hallux more on the lateral aspect of the incision.  Moderate edema.  The incision remains coapted and dry.    Musculoskeletal:    The hallux is in a rectus position clinically.      Again, thank you for allowing me to participate in the care of your patient.        Sincerely,        William Crowe DPM

## 2024-07-18 ENCOUNTER — OFFICE VISIT (OUTPATIENT)
Dept: PODIATRY | Facility: CLINIC | Age: 24
End: 2024-07-18
Payer: COMMERCIAL

## 2024-07-18 VITALS — WEIGHT: 156 LBS | SYSTOLIC BLOOD PRESSURE: 118 MMHG | BODY MASS INDEX: 23.72 KG/M2 | DIASTOLIC BLOOD PRESSURE: 80 MMHG

## 2024-07-18 DIAGNOSIS — Z09 SURGERY FOLLOW-UP EXAMINATION: Primary | ICD-10-CM

## 2024-07-18 DIAGNOSIS — L03.031 CELLULITIS OF TOE OF RIGHT FOOT: ICD-10-CM

## 2024-07-18 PROCEDURE — 99024 POSTOP FOLLOW-UP VISIT: CPT | Performed by: PODIATRIST

## 2024-07-18 NOTE — PROGRESS NOTES
ASSESSMENT:  Encounter Diagnoses   Name Primary?    Surgery follow-up examination Yes    Cellulitis of toe of right foot      MEDICAL DECISION MAKING:  The previous edema and erythema that was concerning for cellulitis, has largely resolved.  Jack is to complete the antibiotic.    The incision appeared ready for suture removal was prepped with alcohol.  I removed the first 3-4 sutures from the proximal aspect of the incision and there was gapping.  Therefore no additional sutures were removed and 3 Steri-Strips were applied.    He is scheduled to return and see my colleague Dr. Leung next week for possible suture removal.    I encouraged ongoing use of the Aircast, as needed elevation and complete the antibiotic.    Disclaimer: This note consists of symbols derived from keyboarding, dictation and/or voice recognition software. As a result, there may be errors in the script that have gone undetected. Please consider this when interpreting information found in this chart.    William Crowe DPM, FACARASELI, MS    [place] Department of Podiatry/Foot & Ankle Surgery      ____________________________________________________________________    HPI:       Jack presents 2+ weeks postop.  No complaints  Taking the antibiotic     PREOPERATIVE DIAGNOSIS:   1) displaced, intra-articular fracture of the distal proximal phalanx, right hallux     PROCEDURE(S):  1) ORIF right hallux, proximal phalanx     INDICATIONS FOR SURGERY:  Chun Muro is a 24-year-old male who initially presented to my clinic on 6/12/2024 after sustaining an injury to the right hallux in early June.  He reported kicking a secured section of PVC piping.  The distal aspect of the proximal phalanx was displaced dorsally creating a flexion deformity at the interphalangeal joint.  A CT of the foot better defined the fracture.  Due to this dorsal displacement or angulation, open reduction with internal fixation was offered.  Although no guarantees were given, I  explained that the healing and outcome is likely more predictable with surgical treatment.  The surgery was delayed a week, due to the patient eating the morning of his original surgical date of 6/26/2024.     *  Past Medical History:   Diagnosis Date    ADHD (attention deficit hyperactivity disorder)    *  *  Past Surgical History:   Procedure Laterality Date    ABDOMINAL WALL SURGERY      as an infant    OPEN REDUCTION INTERNAL FIXATION TOE(S) Right 7/2/2024    Procedure: OPEN REDUCTION INTERNAL FIXATION, RIGHT GREAT TOE;  Surgeon: William Crowe DPM;  Location: [facility] OR    WISDOM TEETH      *  *  Current Outpatient Medications   Medication Sig Dispense Refill    amoxicillin-clavulanate (AUGMENTIN) 875-125 MG tablet Take 1 tablet by mouth 2 times daily 14 tablet 0    acetaminophen (TYLENOL) 500 MG tablet Take 1-2 tablets (500-1,000 mg) by mouth every 8 hours as needed (Patient not taking: Reported on 7/18/2024) 42 tablet 0    hydrOXYzine HCl (ATARAX) 25 MG tablet Take 1 tablet (25 mg) by mouth every 6 hours as needed (Patient not taking: Reported on 7/18/2024) 28 tablet 1    ibuprofen (ADVIL/MOTRIN) 600 MG tablet Take 1 tablet (600 mg) by mouth every 6 hours as needed for moderate pain (Patient not taking: Reported on 7/18/2024) 28 tablet 0         EXAM:    Vitals: /80   Wt 70.8 kg (156 lb)   BMI 23.72 kg/m    BMI: Body mass index is 23.72 kg/m .    Vascular: Interval reduction in right hallux edema    Derm: Incision remains well coapted.  Dry.  Significant decrease in prior erythema.  Sutures are intact.    Musculoskeletal:    The right hallux is in a rectus position in all 3 planes.  Stiff interphalangeal joint range of motion.

## 2024-07-18 NOTE — LETTER
7/18/2024      Chun Muro  [address] [place] [address]      Dear Colleague,    Thank you for referring your patient, Chun Muro, to the [facility] PODIATRY. Please see a copy of my visit note below.    ASSESSMENT:  Encounter Diagnoses   Name Primary?     Surgery follow-up examination Yes     Cellulitis of toe of right foot      MEDICAL DECISION MAKING:  The previous edema and erythema that was concerning for cellulitis, has largely resolved.  Jack is to complete the antibiotic.    The incision appeared ready for suture removal was prepped with alcohol.  I removed the first 3-4 sutures from the proximal aspect of the incision and there was gapping.  Therefore no additional sutures were removed and 3 Steri-Strips were applied.    He is scheduled to return and see my colleague Dr. Leung next week for possible suture removal.    I encouraged ongoing use of the Aircast, as needed elevation and complete the antibiotic.    Disclaimer: This note consists of symbols derived from keyboarding, dictation and/or voice recognition software. As a result, there may be errors in the script that have gone undetected. Please consider this when interpreting information found in this chart.    William Crowe DPM, FACFAS, MS    [place] Department of Podiatry/Foot & Ankle Surgery      ____________________________________________________________________    HPI:       Jack presents 2+ weeks postop.  No complaints  Taking the antibiotic     PREOPERATIVE DIAGNOSIS:   1) displaced, intra-articular fracture of the distal proximal phalanx, right hallux     PROCEDURE(S):  1) ORIF right hallux, proximal phalanx     INDICATIONS FOR SURGERY:  Chun Muro is a 24-year-old male who initially presented to my clinic on 6/12/2024 after sustaining an injury to the right hallux in early June.  He reported kicking a secured section of PVC piping.  The distal aspect of the proximal phalanx was displaced  dorsally creating a flexion deformity at the interphalangeal joint.  A CT of the foot better defined the fracture.  Due to this dorsal displacement or angulation, open reduction with internal fixation was offered.  Although no guarantees were given, I explained that the healing and outcome is likely more predictable with surgical treatment.  The surgery was delayed a week, due to the patient eating the morning of his original surgical date of 6/26/2024.     *  Past Medical History:   Diagnosis Date     ADHD (attention deficit hyperactivity disorder)    *  *  Past Surgical History:   Procedure Laterality Date     ABDOMINAL WALL SURGERY      as an infant     OPEN REDUCTION INTERNAL FIXATION TOE(S) Right 7/2/2024    Procedure: OPEN REDUCTION INTERNAL FIXATION, RIGHT GREAT TOE;  Surgeon: William Crowe DPM;  Location: [facility] OR     WISDOM TEETH      *  *  Current Outpatient Medications   Medication Sig Dispense Refill     amoxicillin-clavulanate (AUGMENTIN) 875-125 MG tablet Take 1 tablet by mouth 2 times daily 14 tablet 0     acetaminophen (TYLENOL) 500 MG tablet Take 1-2 tablets (500-1,000 mg) by mouth every 8 hours as needed (Patient not taking: Reported on 7/18/2024) 42 tablet 0     hydrOXYzine HCl (ATARAX) 25 MG tablet Take 1 tablet (25 mg) by mouth every 6 hours as needed (Patient not taking: Reported on 7/18/2024) 28 tablet 1     ibuprofen (ADVIL/MOTRIN) 600 MG tablet Take 1 tablet (600 mg) by mouth every 6 hours as needed for moderate pain (Patient not taking: Reported on 7/18/2024) 28 tablet 0         EXAM:    Vitals: /80   Wt 70.8 kg (156 lb)   BMI 23.72 kg/m    BMI: Body mass index is 23.72 kg/m .    Vascular: Interval reduction in right hallux edema    Derm: Incision remains well coapted.  Dry.  Significant decrease in prior erythema.  Sutures are intact.    Musculoskeletal:    The right hallux is in a rectus position in all 3 planes.  Stiff interphalangeal joint range of motion.        Again, thank  you for allowing me to participate in the care of your patient.        Sincerely,        William Crowe DPM

## 2024-07-25 ENCOUNTER — OFFICE VISIT (OUTPATIENT)
Dept: PODIATRY | Facility: CLINIC | Age: 24
End: 2024-07-25
Payer: COMMERCIAL

## 2024-07-25 VITALS — DIASTOLIC BLOOD PRESSURE: 72 MMHG | SYSTOLIC BLOOD PRESSURE: 124 MMHG

## 2024-07-25 DIAGNOSIS — S92.401A CLOSED DISPLACED FRACTURE OF PHALANX OF RIGHT GREAT TOE, UNSPECIFIED PHALANX, INITIAL ENCOUNTER: ICD-10-CM

## 2024-07-25 DIAGNOSIS — Z09 SURGERY FOLLOW-UP EXAMINATION: Primary | ICD-10-CM

## 2024-07-25 PROCEDURE — 99024 POSTOP FOLLOW-UP VISIT: CPT | Performed by: PODIATRIST

## 2024-07-25 NOTE — PROGRESS NOTES
Foot & Ankle Surgery  July 25, 2024    S:  Patient in today sp ORIF right hallux proximal phalanx fracture by Dr. Crowe on 7-24.  Pain levels low.  Suture removal was attempted last week but the incision needing further time and the patient is in today for a recheck.    /72       ROS - positive for CC.  Patient denies current nausea, vomiting, chills, fevers, belly pain, calf pain, chest pain or SOB.  Complete remainder of ROS is otherwise neg.    PE -dry scab is seen along the incision.  Sutures were removed without gapping.  Mild swelling but within normal as for the stage postop.  No signs of infection are noted.  Skin shows no trophic, color or temperature changes otherwise.  No calf redness, swelling or pain noted otherwise.    A/P - 24 year old yo patient approx 3 weeks sp above procedure  -Remaining sutures were removed without gapping or dehiscence.  As a preventative measure, Steri-Strips were placed along the remainder of the incision.  -Continue weightbearing as previously discussed by Dr. Crowe  -Okay to increase activity levels to tolerance  -Rest, ice, elevate and utilize Tylenol as needed for pain control  -Okay to wash the foot tomorrow, such as a sponge bath, but avoid soaking/submerging x 1 week    Follow up  -3 weeks or sooner with acute issues    Plan for [id number] -once healed sufficiently      Ricardo Leung, COLBY [facility]AC[facility] FACFAOM  Podiatric Foot & Ankle Surgeon  [facility]  [phone]    Disclaimer: This note consists of symbols derived from keyboarding, dictation and/or voice recognition software. As a result, there may be errors in the script that have gone undetected. Please consider this when interpreting information found in this chart.

## 2024-07-25 NOTE — LETTER
7/25/2024      Chun Muro  [address] [facility] [address]      Dear Colleague,    Thank you for referring your patient, Chun Muro, to the [facility] PODIATRY. Please see a copy of my visit note below.    Foot & Ankle Surgery  July 25, 2024    S:  Patient in today sp ORIF right hallux proximal phalanx fracture by Dr. Crowe on 7-24.  Pain levels low.  Suture removal was attempted last week but the incision needing further time and the patient is in today for a recheck.    /72       ROS - positive for CC.  Patient denies current nausea, vomiting, chills, fevers, belly pain, calf pain, chest pain or SOB.  Complete remainder of ROS is otherwise neg.    PE -dry scab is seen along the incision.  Sutures were removed without gapping.  Mild swelling but within normal as for the stage postop.  No signs of infection are noted.  Skin shows no trophic, color or temperature changes otherwise.  No calf redness, swelling or pain noted otherwise.    A/P - 24 year old yo patient approx 3 weeks sp above procedure  -Remaining sutures were removed without gapping or dehiscence.  As a preventative measure, Steri-Strips were placed along the remainder of the incision.  -Continue weightbearing as previously discussed by Dr. Crowe  -Okay to increase activity levels to tolerance  -Rest, ice, elevate and utilize Tylenol as needed for pain control  -Okay to wash the foot tomorrow, such as a sponge bath, but avoid soaking/submerging x 1 week    Follow up  -3 weeks or sooner with acute issues    Plan for [id number] -once healed sufficiently      Ricardo Leung, COLBY FACFAS FACFAOM  Podiatric Foot & Ankle Surgeon  [facility]  [phone]    Disclaimer: This note consists of symbols derived from keyboarding, dictation and/or voice recognition software. As a result, there may be errors in the script that have gone undetected. Please consider this when interpreting information  found in this chart.      Again, thank you for allowing me to participate in the care of your patient.        Sincerely,        Ricardo Leung DPM, COLBY

## 2024-07-25 NOTE — PATIENT INSTRUCTIONS
Thank you for choosing [facility] Podiatry / Foot & Ankle Surgery!    DR. MENDES'S CLINIC LOCATIONS:     [facility] (Friday) TRIAGE LINE: [phone] [address] [facility]  APPOINTMENTS: [phone]   [place] [address] RADIOLOGY: [phone]    PHYSICAL THERAPY: [phone]    SET UP SURGERY: [phone]   [place] (Mon-Tues AM-Thurs) BILLING QUESTIONS: [phone]   [address] FAX: [phone]   [place] [address] [facility]: [phone]     You were seen today approximately 3 weeks out from the right great toe fracture repair surgery.  All sutures were removed.  Recommendations:    1.  Continue weightbearing as previously discussed by Dr. Crowe;    2.  Rest, ice, elevate as needed based on swelling and discomfort;    3.  You are okay to wash the foot tomorrow, sponge bath, but avoid soaking/submerging.    Follow-up with Dr. Crowe in 3 weeks.  Arrive 30 minutes early as we will be getting updated x-rays.  Call prior with any questions or concerns.  He can remove the Steri-Strips if they are still in place in 1 week.

## 2024-08-14 ENCOUNTER — ANCILLARY PROCEDURE (OUTPATIENT)
Dept: GENERAL RADIOLOGY | Facility: CLINIC | Age: 24
End: 2024-08-14
Attending: PODIATRIST
Payer: COMMERCIAL

## 2024-08-14 ENCOUNTER — OFFICE VISIT (OUTPATIENT)
Dept: PODIATRY | Facility: CLINIC | Age: 24
End: 2024-08-14
Payer: COMMERCIAL

## 2024-08-14 VITALS — WEIGHT: 165 LBS | BODY MASS INDEX: 25.09 KG/M2

## 2024-08-14 DIAGNOSIS — Z09 SURGERY FOLLOW-UP EXAMINATION: ICD-10-CM

## 2024-08-14 DIAGNOSIS — S92.401A CLOSED DISPLACED FRACTURE OF PHALANX OF RIGHT GREAT TOE, UNSPECIFIED PHALANX, INITIAL ENCOUNTER: ICD-10-CM

## 2024-08-14 DIAGNOSIS — Z09 SURGERY FOLLOW-UP EXAMINATION: Primary | ICD-10-CM

## 2024-08-14 PROCEDURE — 99024 POSTOP FOLLOW-UP VISIT: CPT | Performed by: PODIATRIST

## 2024-08-14 PROCEDURE — 73630 X-RAY EXAM OF FOOT: CPT | Mod: TC | Performed by: RADIOLOGY

## 2024-08-14 NOTE — PROGRESS NOTES
ASSESSMENT:  Encounter Diagnoses   Name Primary?    Surgery follow-up examination Yes    Closed displaced fracture of phalanx of right great toe, unspecified phalanx, initial encounter      MEDICAL DECISION MAKING:  I personally reviewed the x-ray images.  Stable postoperative findings.  The interphalangeal joint is well-preserved and congruent.    I explained that the stiffness is likely related to scar tissue involving the capsule.  A rigid toe is a functional toe.  He might regain some range of motion given time in regular shoes.    Okay to discontinue the Aircast.  Return to regular, supportive athletic type shoes.  I recommend he avoid higher impact activities until 3 months postop.    Recommend follow-up in some form in 2 months.  [employer] messaging is fine, if doing well.    Disclaimer: This note consists of symbols derived from keyboarding, dictation and/or voice recognition software. As a result, there may be errors in the script that have gone undetected. Please consider this when interpreting information found in this chart.    William Crowe DPM, FACFAS, MS    [place] Department of Podiatry/Foot & Ankle Surgery      ____________________________________________________________________    HPI:       Chun presents 6+ weeks postop.  No complaints  He was last evaluated by Dr. Reed on 7/25/2024, when I was out of the office.  The remaining sutures were removed at that time and there is no gapping or dehiscence.     PREOPERATIVE DIAGNOSIS:   1) displaced, intra-articular fracture of the distal proximal phalanx, right hallux     PROCEDURE(S):  1) ORIF right hallux, proximal phalanx     INDICATIONS FOR SURGERY:  Chun Muro is a 24-year-old male who initially presented to my clinic on 6/12/2024 after sustaining an injury to the right hallux in early June.  He reported kicking a secured section of PVC piping.  The distal aspect of the proximal phalanx was displaced dorsally creating a flexion deformity at the  interphalangeal joint.  A CT of the foot better defined the fracture.  Due to this dorsal displacement or angulation, open reduction with internal fixation was offered.  Although no guarantees were given, I explained that the healing and outcome is likely more predictable with surgical treatment.  The surgery was delayed a week, due to the patient eating the morning of his original surgical date of 6/26/2024.      *  *  Past Medical History:   Diagnosis Date    ADHD (attention deficit hyperactivity disorder)    *  *  Past Surgical History:   Procedure Laterality Date    ABDOMINAL WALL SURGERY      as an infant    OPEN REDUCTION INTERNAL FIXATION TOE(S) Right 7/2/2024    Procedure: OPEN REDUCTION INTERNAL FIXATION, RIGHT GREAT TOE;  Surgeon: William Crowe DPM;  Location: [facility] OR    WISDOM TEETH      *  *  Current Outpatient Medications   Medication Sig Dispense Refill    acetaminophen (TYLENOL) 500 MG tablet Take 1-2 tablets (500-1,000 mg) by mouth every 8 hours as needed (Patient not taking: Reported on 7/18/2024) 42 tablet 0    amoxicillin-clavulanate (AUGMENTIN) 875-125 MG tablet Take 1 tablet by mouth 2 times daily (Patient not taking: Reported on 7/25/2024) 14 tablet 0    hydrOXYzine HCl (ATARAX) 25 MG tablet Take 1 tablet (25 mg) by mouth every 6 hours as needed (Patient not taking: Reported on 7/18/2024) 28 tablet 1    ibuprofen (ADVIL/MOTRIN) 600 MG tablet Take 1 tablet (600 mg) by mouth every 6 hours as needed for moderate pain (Patient not taking: Reported on 7/18/2024) 28 tablet 0         EXAM:    Vitals: Wt 74.8 kg (165 lb)   BMI 25.09 kg/m    BMI: Body mass index is 25.09 kg/m .      Vascular: Interval reduction of right hallux edema.  The toe appears well-perfused.    Neuro: Light touch sensation is intact to the distal aspect of the right hallux.    Derm: The incision is coapted and healing.  No evidence of gapping or dehiscence.  There is some residual scab.    Musculoskeletal:   He is able to  plantarflex and dorsiflex the right hallux against resistance.  The interphalangeal joint is stiff, rigid.    X-Ray Findings:  I personally reviewed the right foot images.  See comments above

## 2024-08-14 NOTE — LETTER
8/14/2024      Chun Muro  [address] [facility] [address]      Dear Colleague,    Thank you for referring your patient, Chun Muro, to the [facility]. Please see a copy of my visit note below.    ASSESSMENT:  Encounter Diagnoses   Name Primary?     Surgery follow-up examination Yes     Closed displaced fracture of phalanx of right great toe, unspecified phalanx, initial encounter      MEDICAL DECISION MAKING:  I personally reviewed the x-ray images.  Stable postoperative findings.  The interphalangeal joint is well-preserved and congruent.    I explained that the stiffness is likely related to scar tissue involving the capsule.  A rigid toe is a functional toe.  He might regain some range of motion given time in regular shoes.    Okay to discontinue the Aircast.  Return to regular, supportive athletic type shoes.  I recommend he avoid higher impact activities until 3 months postop.    Recommend follow-up in some form in 2 months.  [employer] messaging is fine, if doing well.    Disclaimer: This note consists of symbols derived from keyboarding, dictation and/or voice recognition software. As a result, there may be errors in the script that have gone undetected. Please consider this when interpreting information found in this chart.    William Crowe DPM, FACFAS, MS    [place] Department of Podiatry/Foot & Ankle Surgery      ____________________________________________________________________    HPI:       Chun presents 6+ weeks postop.  No complaints  He was last evaluated by Dr. Reed on 7/25/2024, when I was out of the office.  The remaining sutures were removed at that time and there is no gapping or dehiscence.     PREOPERATIVE DIAGNOSIS:   1) displaced, intra-articular fracture of the distal proximal phalanx, right hallux     PROCEDURE(S):  1) ORIF right hallux, proximal phalanx     INDICATIONS FOR SURGERY:  Chun Muro is a 24-year-old male who initially  presented to my clinic on 6/12/2024 after sustaining an injury to the right hallux in early June.  He reported kicking a secured section of PVC piping.  The distal aspect of the proximal phalanx was displaced dorsally creating a flexion deformity at the interphalangeal joint.  A CT of the foot better defined the fracture.  Due to this dorsal displacement or angulation, open reduction with internal fixation was offered.  Although no guarantees were given, I explained that the healing and outcome is likely more predictable with surgical treatment.  The surgery was delayed a week, due to the patient eating the morning of his original surgical date of 6/26/2024.      *  *  Past Medical History:   Diagnosis Date     ADHD (attention deficit hyperactivity disorder)    *  *  Past Surgical History:   Procedure Laterality Date     ABDOMINAL WALL SURGERY      as an infant     OPEN REDUCTION INTERNAL FIXATION TOE(S) Right 7/2/2024    Procedure: OPEN REDUCTION INTERNAL FIXATION, RIGHT GREAT TOE;  Surgeon: William Crowe DPM;  Location: [facility] OR     WISDOM TEETH      *  *  Current Outpatient Medications   Medication Sig Dispense Refill     acetaminophen (TYLENOL) 500 MG tablet Take 1-2 tablets (500-1,000 mg) by mouth every 8 hours as needed (Patient not taking: Reported on 7/18/2024) 42 tablet 0     amoxicillin-clavulanate (AUGMENTIN) 875-125 MG tablet Take 1 tablet by mouth 2 times daily (Patient not taking: Reported on 7/25/2024) 14 tablet 0     hydrOXYzine HCl (ATARAX) 25 MG tablet Take 1 tablet (25 mg) by mouth every 6 hours as needed (Patient not taking: Reported on 7/18/2024) 28 tablet 1     ibuprofen (ADVIL/MOTRIN) 600 MG tablet Take 1 tablet (600 mg) by mouth every 6 hours as needed for moderate pain (Patient not taking: Reported on 7/18/2024) 28 tablet 0         EXAM:    Vitals: Wt 74.8 kg (165 lb)   BMI 25.09 kg/m    BMI: Body mass index is 25.09 kg/m .      Vascular: Interval reduction of right hallux edema.  The  toe appears well-perfused.    Neuro: Light touch sensation is intact to the distal aspect of the right hallux.    Derm: The incision is coapted and healing.  No evidence of gapping or dehiscence.  There is some residual scab.    Musculoskeletal:   He is able to plantarflex and dorsiflex the right hallux against resistance.  The interphalangeal joint is stiff, rigid.    X-Ray Findings:  I personally reviewed the right foot images.  See comments above          Again, thank you for allowing me to participate in the care of your patient.        Sincerely,        William Crowe DPM

## 2025-07-13 ENCOUNTER — HEALTH MAINTENANCE LETTER (OUTPATIENT)
Age: 25
End: 2025-07-13

## (undated) DEVICE — SU VICRYL 4-0 PS-2 18" UND J496H

## (undated) DEVICE — PREP CHLORAPREP 26ML TINTED HI-LITE ORANGE 930815

## (undated) DEVICE — Device

## (undated) DEVICE — BNDG ROLLER GAUZE CONFORM 3"X4YD 41-53

## (undated) DEVICE — SOL NACL 0.9% IRRIG 1000ML BOTTLE 2F7124

## (undated) DEVICE — BLADE SAW OSCILLATING STRYK MED 9.0X25X0.38MM 2296-003-111

## (undated) DEVICE — SU PROLENE 4-0 PS-2 18" 8682G

## (undated) DEVICE — CAST PADDING 4" STERILE 9044S

## (undated) DEVICE — IMM LIMB ELEVATOR DC40-0203

## (undated) DEVICE — GLOVE BIOGEL PI MICRO SZ 8.0 48580

## (undated) DEVICE — CAST PADDING 4" UNSTERILE 9044

## (undated) DEVICE — SOL WATER IRRIG 1000ML BOTTLE 2F7114

## (undated) DEVICE — DRAPE STERI TOWEL LG 1010

## (undated) DEVICE — DECANTER BAG 2002S

## (undated) DEVICE — LINEN TOWEL PACK X5 5464

## (undated) DEVICE — PACK EXTREMITY SOP15EXFSD

## (undated) DEVICE — BNDG KLING 4" 2236

## (undated) DEVICE — CAST PADDING 4" STERILE CS9044

## (undated) DEVICE — DRAPE MINI C-ARM 4003

## (undated) DEVICE — SU PROLENE 4-0 FS-2 18' 8683G

## (undated) DEVICE — BNDG ELASTIC 4"X15YDS STERILE

## (undated) DEVICE — SU VICRYL 3-0 SH 27" J316H

## (undated) DEVICE — GLOVE BIOGEL PI MICRO INDICATOR UNDERGLOVE SZ 8.0 48980

## (undated) DEVICE — DRSG GAUZE 4X4" 2187

## (undated) RX ORDER — KETOROLAC TROMETHAMINE 30 MG/ML
INJECTION, SOLUTION INTRAMUSCULAR; INTRAVENOUS
Status: DISPENSED
Start: 2024-07-02

## (undated) RX ORDER — FENTANYL CITRATE 50 UG/ML
INJECTION, SOLUTION INTRAMUSCULAR; INTRAVENOUS
Status: DISPENSED
Start: 2024-07-02

## (undated) RX ORDER — PROPOFOL 10 MG/ML
INJECTION, EMULSION INTRAVENOUS
Status: DISPENSED
Start: 2024-07-02

## (undated) RX ORDER — CEFAZOLIN SODIUM/WATER 2 G/20 ML
SYRINGE (ML) INTRAVENOUS
Status: DISPENSED
Start: 2024-07-02